# Patient Record
Sex: FEMALE | Race: WHITE | HISPANIC OR LATINO
[De-identification: names, ages, dates, MRNs, and addresses within clinical notes are randomized per-mention and may not be internally consistent; named-entity substitution may affect disease eponyms.]

---

## 2018-02-08 PROBLEM — Z00.00 ENCOUNTER FOR PREVENTIVE HEALTH EXAMINATION: Status: ACTIVE | Noted: 2018-02-08

## 2019-05-21 ENCOUNTER — TRANSCRIPTION ENCOUNTER (OUTPATIENT)
Age: 64
End: 2019-05-21

## 2020-03-06 ENCOUNTER — TRANSCRIPTION ENCOUNTER (OUTPATIENT)
Age: 65
End: 2020-03-06

## 2020-03-12 ENCOUNTER — APPOINTMENT (OUTPATIENT)
Dept: FAMILY MEDICINE | Facility: CLINIC | Age: 65
End: 2020-03-12

## 2020-04-13 ENCOUNTER — TRANSCRIPTION ENCOUNTER (OUTPATIENT)
Age: 65
End: 2020-04-13

## 2020-06-08 DIAGNOSIS — M85.80 OTHER SPECIFIED DISORDERS OF BONE DENSITY AND STRUCTURE, UNSPECIFIED SITE: ICD-10-CM

## 2022-08-04 ENCOUNTER — APPOINTMENT (OUTPATIENT)
Dept: THORACIC SURGERY | Facility: CLINIC | Age: 67
End: 2022-08-04

## 2024-01-03 ENCOUNTER — APPOINTMENT (OUTPATIENT)
Dept: ORTHOPEDIC SURGERY | Facility: CLINIC | Age: 69
End: 2024-01-03
Payer: MEDICARE

## 2024-01-03 VITALS — BODY MASS INDEX: 19.33 KG/M2 | HEIGHT: 70 IN | WEIGHT: 135 LBS

## 2024-01-03 DIAGNOSIS — Z78.9 OTHER SPECIFIED HEALTH STATUS: ICD-10-CM

## 2024-01-03 DIAGNOSIS — Z85.118 PERSONAL HISTORY OF OTHER MALIGNANT NEOPLASM OF BRONCHUS AND LUNG: ICD-10-CM

## 2024-01-03 PROCEDURE — 99204 OFFICE O/P NEW MOD 45 MIN: CPT

## 2024-01-03 NOTE — IMAGING
[de-identified] : Right elbow with ecchymosis, +ttp medial. Active arc from 25 to 125. Pro/sup to 70/70. No instability on active motion. Able to make fist, oppose thumb to small finger and abduct fingers. Sensation intact throughout. <2sec cap refill.  Right elbow radiographs with coronoid tip fracture. Radiocapitellar and ulnohumeral joint intact. (3-view)

## 2024-01-03 NOTE — ASSESSMENT
[FreeTextEntry1] : Right coronoid tip fracture, minimally displaced with presumed RCL and UCL tear - will manage with closed management  Reviewed radiographs with patient and discussed pathoanatomy. Discussed alignment is within acceptable parameters to manage with closed management in brace. Discussed risk of stiffness, pain, instability, and displacement requiring operative intervention. NWB, elevate, NSAIDs prn.  PT for ROM.  F/u 3weeks; reassess, repeat films

## 2024-01-03 NOTE — HISTORY OF PRESENT ILLNESS
[de-identified] : Age: 68F PMHx: Osteopenia Hand Dominance: RHD Chief Complaint: right elbow pain s/p fall 12/29/23. Patient reports that she slipped going down a ramp, causing her to fall onto her right elbow. Patient went to [facility] on 12/29/23 and had radiographs performed, advised of a small fracture. Denies numbness/tingling.  Trauma: yes Outside Imaging/Treatment: X-rays at [facility] 12/29/23, advised of a small fracture OTC Medications: Tylenol and Aleve with relief OT/PT: none  Bracing: elbow currently wrapped Pain worse with:  Pain better with: being in wrap

## 2024-01-24 ENCOUNTER — APPOINTMENT (OUTPATIENT)
Dept: ORTHOPEDIC SURGERY | Facility: CLINIC | Age: 69
End: 2024-01-24
Payer: MEDICARE

## 2024-01-24 VITALS — BODY MASS INDEX: 19.33 KG/M2 | HEIGHT: 70 IN | WEIGHT: 135 LBS

## 2024-01-24 PROCEDURE — 99213 OFFICE O/P EST LOW 20 MIN: CPT

## 2024-01-24 PROCEDURE — 73080 X-RAY EXAM OF ELBOW: CPT | Mod: FY,RT

## 2024-01-24 NOTE — HISTORY OF PRESENT ILLNESS
[de-identified] : Age: 68F PMHx: Osteopenia Hand Dominance: RHD Chief Complaint: right elbow pain s/p fall 12/29/23. Patient reports that she slipped going down a ramp, causing her to fall onto her right elbow. Patient went to [facility] on 12/29/23 and had radiographs performed, advised of a small fracture. Denies numbness/tingling.  Trauma: yes Outside Imaging/Treatment: X-rays at [facility] 12/29/23, advised of a small fracture OTC Medications: Tylenol and Aleve with relief OT/PT: none  Bracing: elbow currently wrapped Pain worse with:  Pain better with: being in wrap   01/24/24: f/u right elbow. Patient reports soreness, but that she is improving. Patient reports that she is attending PT 2x a week with relief. Patient currently taking tylenol and advil PRN with some temporary relief. Denies numbness/tingling.

## 2024-01-24 NOTE — ASSESSMENT
[FreeTextEntry1] : Right coronoid tip fracture, minimally displaced with presumed RCL and UCL tear - will manage with closed management  Reviewed radiographs with patient and discussed pathoanatomy. Discussed alignment is within acceptable parameters to manage with closed management in brace. Discussed risk of stiffness, pain, instability, and displacement requiring operative intervention. Ease into use, elevate, NSAIDs prn.  PT for ROM.  F/u 8weeks; reassess, repeat films (lizabeth - MALLY dasia)

## 2024-01-24 NOTE — IMAGING
[de-identified] : Right elbow with ecchymosis, +ttp medial. Active arc from 15 to 135. Pro/sup to 70/70. No instability on active motion. Able to make fist, oppose thumb to small finger and abduct fingers. Sensation intact throughout. <2sec cap refill.  Right elbow radiographs with coronoid tip fracture. Radiocapitellar and ulnohumeral joint intact. (3-view)

## 2024-03-13 ENCOUNTER — APPOINTMENT (OUTPATIENT)
Dept: ORTHOPEDIC SURGERY | Facility: CLINIC | Age: 69
End: 2024-03-13
Payer: MEDICARE

## 2024-03-13 VITALS — HEIGHT: 70 IN | BODY MASS INDEX: 19.33 KG/M2 | WEIGHT: 135 LBS

## 2024-03-13 DIAGNOSIS — S59.909A UNSPECIFIED INJURY OF UNSPECIFIED ELBOW, INITIAL ENCOUNTER: ICD-10-CM

## 2024-03-13 PROCEDURE — 99213 OFFICE O/P EST LOW 20 MIN: CPT

## 2024-03-13 PROCEDURE — 73080 X-RAY EXAM OF ELBOW: CPT | Mod: RT

## 2024-03-13 NOTE — IMAGING
[de-identified] : Right elbow with resolved ecchymosis, +ttp medial. Active arc from 5 to 140. Pro/sup to 70/70. No instability on active motion. Able to make fist, oppose thumb to small finger and abduct fingers. Sensation intact throughout. <2sec cap refill.  Right elbow radiographs with coronoid tip fracture. Radiocapitellar and ulnohumeral joint intact. (3-view)

## 2024-03-13 NOTE — ASSESSMENT
[FreeTextEntry1] : Right coronoid tip fracture, minimally displaced with presumed RCL and UCL tear - will manage with closed management  Reviewed radiographs with patient and discussed pathoanatomy. Discussed alignment is within acceptable parameters to manage with closed management in brace. Discussed risk of stiffness, pain, instability, and displacement requiring operative intervention. WBAT, elevate, NSAIDs prn.  PT for ROM/strengthening  F/u 16weeks/prn; reassess, repeat films ( - MALLY dasia)